# Patient Record
Sex: FEMALE | Race: AMERICAN INDIAN OR ALASKA NATIVE | Employment: FULL TIME | URBAN - METROPOLITAN AREA
[De-identification: names, ages, dates, MRNs, and addresses within clinical notes are randomized per-mention and may not be internally consistent; named-entity substitution may affect disease eponyms.]

---
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
## 2020-04-16 ENCOUNTER — HOSPITAL ENCOUNTER (EMERGENCY)
Facility: CLINIC | Age: 20
Discharge: HOME | End: 2020-04-16
Attending: EMERGENCY MEDICINE | Admitting: EMERGENCY MEDICINE

## 2020-04-16 VITALS
HEART RATE: 115 BPM | SYSTOLIC BLOOD PRESSURE: 115 MMHG | TEMPERATURE: 98.9 F | RESPIRATION RATE: 16 BRPM | DIASTOLIC BLOOD PRESSURE: 55 MMHG | OXYGEN SATURATION: 98 %

## 2020-04-16 DIAGNOSIS — R00.0 TACHYCARDIA, UNSPECIFIED: ICD-10-CM

## 2020-04-16 DIAGNOSIS — R42 VERTIGO: ICD-10-CM

## 2020-04-16 DIAGNOSIS — R55 SYNCOPE, UNSPECIFIED SYNCOPE TYPE: ICD-10-CM

## 2020-04-16 LAB
ALBUMIN UR-MCNC: 10 MG/DL
ANION GAP SERPL CALCULATED.3IONS-SCNC: 6 MMOL/L (ref 3–14)
APPEARANCE UR: CLEAR
BASOPHILS # BLD AUTO: 0 10E9/L (ref 0–0.2)
BASOPHILS NFR BLD AUTO: 0.2 %
BILIRUB UR QL STRIP: NEGATIVE
BUN SERPL-MCNC: 16 MG/DL (ref 7–30)
CALCIUM SERPL-MCNC: 8.8 MG/DL (ref 8.5–10.1)
CHLORIDE SERPL-SCNC: 108 MMOL/L (ref 96–110)
CO2 SERPL-SCNC: 25 MMOL/L (ref 20–32)
COLOR UR AUTO: YELLOW
CREAT SERPL-MCNC: 0.64 MG/DL (ref 0.5–1)
DIFFERENTIAL METHOD BLD: ABNORMAL
EOSINOPHIL # BLD AUTO: 0 10E9/L (ref 0–0.7)
EOSINOPHIL NFR BLD AUTO: 0.2 %
ERYTHROCYTE [DISTWIDTH] IN BLOOD BY AUTOMATED COUNT: 16.7 % (ref 10–15)
GFR SERPL CREATININE-BSD FRML MDRD: >90 ML/MIN/{1.73_M2}
GLUCOSE SERPL-MCNC: 120 MG/DL (ref 70–99)
GLUCOSE UR STRIP-MCNC: NEGATIVE MG/DL
HCG SERPL QL: NEGATIVE
HCG UR QL: NEGATIVE
HCT VFR BLD AUTO: 23.2 % (ref 35–47)
HGB BLD-MCNC: 7.3 G/DL (ref 11.7–15.7)
HGB UR QL STRIP: ABNORMAL
IMM GRANULOCYTES # BLD: 0.3 10E9/L (ref 0–0.4)
IMM GRANULOCYTES NFR BLD: 1.6 %
INTERPRETATION ECG - MUSE: NORMAL
KETONES UR STRIP-MCNC: 10 MG/DL
LEUKOCYTE ESTERASE UR QL STRIP: NEGATIVE
LYMPHOCYTES # BLD AUTO: 2.7 10E9/L (ref 0.8–5.3)
LYMPHOCYTES NFR BLD AUTO: 13.9 %
MCH RBC QN AUTO: 22.7 PG (ref 26.5–33)
MCHC RBC AUTO-ENTMCNC: 31.5 G/DL (ref 31.5–36.5)
MCV RBC AUTO: 72 FL (ref 78–100)
MONOCYTES # BLD AUTO: 0.7 10E9/L (ref 0–1.3)
MONOCYTES NFR BLD AUTO: 3.6 %
MUCOUS THREADS #/AREA URNS LPF: PRESENT /LPF
NEUTROPHILS # BLD AUTO: 15.8 10E9/L (ref 1.6–8.3)
NEUTROPHILS NFR BLD AUTO: 80.5 %
NITRATE UR QL: NEGATIVE
NRBC # BLD AUTO: 0.1 10*3/UL
NRBC BLD AUTO-RTO: 0 /100
PH UR STRIP: 6 PH (ref 5–7)
PLATELET # BLD AUTO: 287 10E9/L (ref 150–450)
POTASSIUM SERPL-SCNC: 3.8 MMOL/L (ref 3.4–5.3)
RBC # BLD AUTO: 3.21 10E12/L (ref 3.8–5.2)
RBC #/AREA URNS AUTO: >182 /HPF (ref 0–2)
SODIUM SERPL-SCNC: 139 MMOL/L (ref 133–144)
SOURCE: ABNORMAL
SP GR UR STRIP: 1.02 (ref 1–1.03)
SQUAMOUS #/AREA URNS AUTO: <1 /HPF (ref 0–1)
UROBILINOGEN UR STRIP-MCNC: NORMAL MG/DL (ref 0–2)
WBC # BLD AUTO: 19.5 10E9/L (ref 4–11)
WBC #/AREA URNS AUTO: 2 /HPF (ref 0–5)

## 2020-04-16 PROCEDURE — 25000128 H RX IP 250 OP 636: Performed by: EMERGENCY MEDICINE

## 2020-04-16 PROCEDURE — 99284 EMERGENCY DEPT VISIT MOD MDM: CPT | Mod: 25

## 2020-04-16 PROCEDURE — 81025 URINE PREGNANCY TEST: CPT | Performed by: EMERGENCY MEDICINE

## 2020-04-16 PROCEDURE — 81001 URINALYSIS AUTO W/SCOPE: CPT | Performed by: EMERGENCY MEDICINE

## 2020-04-16 PROCEDURE — 93010 ELECTROCARDIOGRAM REPORT: CPT | Mod: Z6 | Performed by: EMERGENCY MEDICINE

## 2020-04-16 PROCEDURE — 25000132 ZZH RX MED GY IP 250 OP 250 PS 637: Performed by: EMERGENCY MEDICINE

## 2020-04-16 PROCEDURE — 25800030 ZZH RX IP 258 OP 636: Performed by: EMERGENCY MEDICINE

## 2020-04-16 PROCEDURE — 85025 COMPLETE CBC W/AUTO DIFF WBC: CPT | Performed by: EMERGENCY MEDICINE

## 2020-04-16 PROCEDURE — 84703 CHORIONIC GONADOTROPIN ASSAY: CPT | Performed by: EMERGENCY MEDICINE

## 2020-04-16 PROCEDURE — 93005 ELECTROCARDIOGRAM TRACING: CPT

## 2020-04-16 PROCEDURE — 80048 BASIC METABOLIC PNL TOTAL CA: CPT | Performed by: EMERGENCY MEDICINE

## 2020-04-16 PROCEDURE — 96374 THER/PROPH/DIAG INJ IV PUSH: CPT

## 2020-04-16 PROCEDURE — 99284 EMERGENCY DEPT VISIT MOD MDM: CPT | Mod: 25 | Performed by: EMERGENCY MEDICINE

## 2020-04-16 PROCEDURE — 96361 HYDRATE IV INFUSION ADD-ON: CPT

## 2020-04-16 RX ORDER — ACETAMINOPHEN 325 MG/1
975 TABLET ORAL ONCE
Status: COMPLETED | OUTPATIENT
Start: 2020-04-16 | End: 2020-04-16

## 2020-04-16 RX ORDER — ONDANSETRON 2 MG/ML
4 INJECTION INTRAMUSCULAR; INTRAVENOUS ONCE
Status: COMPLETED | OUTPATIENT
Start: 2020-04-16 | End: 2020-04-16

## 2020-04-16 RX ORDER — MECLIZINE HYDROCHLORIDE 25 MG/1
50 TABLET ORAL ONCE
Status: COMPLETED | OUTPATIENT
Start: 2020-04-16 | End: 2020-04-16

## 2020-04-16 RX ORDER — MECLIZINE HCL 12.5 MG 12.5 MG/1
25 TABLET ORAL 3 TIMES DAILY PRN
Qty: 30 TABLET | Refills: 0 | Status: SHIPPED | OUTPATIENT
Start: 2020-04-16

## 2020-04-16 RX ADMIN — MECLIZINE HYDROCHLORIDE 50 MG: 25 TABLET ORAL at 21:45

## 2020-04-16 RX ADMIN — SODIUM CHLORIDE 1000 ML: 9 INJECTION, SOLUTION INTRAVENOUS at 18:01

## 2020-04-16 RX ADMIN — ACETAMINOPHEN 975 MG: 325 TABLET, FILM COATED ORAL at 22:26

## 2020-04-16 RX ADMIN — ONDANSETRON 4 MG: 2 INJECTION INTRAMUSCULAR; INTRAVENOUS at 18:01

## 2020-04-16 RX ADMIN — SODIUM CHLORIDE 1000 ML: 9 INJECTION, SOLUTION INTRAVENOUS at 19:12

## 2020-04-16 NOTE — ED PROVIDER NOTES
[facility] EMERGENCY DEPARTMENT ([facility])    4/16/20        History     Chief Complaint   Patient presents with     Loss of Consciousness     pt report fainting today in her kitchen unwinessed by anyone but was found by granmother, pt unsure if she hit her head but no swelling or pain except for a headache      HPI  Juan Xiao is a 19 year old female with a past medical history of diabetes who presents to the Emergency Department for possible syncopal episode.  Patient states she has felt intermittently tired and weak over the last 2 days also complaining of intermittent nausea that is worsened when eating.  She is also concerned that she has had a heavier than normal.  And has had clotting with her menstrual period.  Patient states she has had 2 change her pad every hour due to saturation.  She denies any chance of pregnancy and states she has not had sexual intercourse.  She denies any prior history of abdominal surgery.  Patient also denies any abdominal pain, chest pain, fevers or chills.    I have reviewed the Medications, Allergies, Past Medical and Surgical History, and Social History in the [employer] system.  PAST MEDICAL HISTORY:   Past Medical History:   Diagnosis Date     Diabetes (H)      Prediabetes     Being monitored       PAST SURGICAL HISTORY:   Past Surgical History:   Procedure Laterality Date     ENT SURGERY      tonsils age 12       Past medical history, past surgical history, medications, and allergies were reviewed with the patient. Additional pertinent items: None    FAMILY HISTORY:   Family History   Problem Relation Age of Onset     Depression Mother      Depression Maternal Aunt      Depression Cousin      Anxiety Disorder Cousin        SOCIAL HISTORY:   Social History     Tobacco Use     Smoking status: Never Smoker   Substance Use Topics     Alcohol use: No     Social history was reviewed with the patient. Additional pertinent items: None      Patient's Medications   New  Prescriptions    No medications on file   Previous Medications    IBUPROFEN (ADVIL/MOTRIN) 200 MG TABLET    Take 4 tablets (800 mg) by mouth 4 times daily    MIRTAZAPINE (REMERON PO)    Take 15 mg by mouth At Bedtime One half to one tab at bedtime   Modified Medications    No medications on file   Discontinued Medications    No medications on file        No Known Allergies     Review of Systems  ROS: 14 point ROS neg other than the symptoms noted above in the HPI.    Physical Exam   BP: 136/64  Pulse: 126  Temp: 98.4  F (36.9  C)  Resp: 18  SpO2: 99 %      Physical Exam  GEN: Well appearing, non toxic, cooperative.   HEENT: The head is normocephalic and atraumatic. Pupils are equal round and reactive to light. Extraocular motions are intact. There is no facial swelling. The neck is nontender and supple.   CV: Tachycardic rate and rhythm without murmurs rubs or gallops. 2+ radial pulses bilaterally.  PULM: Clear to auscultation bilaterally.  ABD: Soft, nontender, nondistended.   EXT: Full range of motion.  No edema.  NEURO: Cranial nerves II through XII are intact and symmetric. Bilateral upper and lower extremities grossly show full range of motion without any focal deficits.   SKIN: No rashes, ecchymosis, or lacerations  PSYCH: Calm and cooperative, interactive.     ED Course        Procedures             EKG Interpretation:      Interpreted by Juan Dean MD  Time reviewed: 1756  Symptoms at time of EKG: syncope   Rhythm: normal sinus   Rate: tachycardic  Axis: normal  Ectopy: none  Conduction: normal  ST Segments/ T Waves: No ST-T wave changes  Q Waves: none  Comparison to prior: No old EKG available    Clinical Impression: Sinus tachycardia                        Results for orders placed or performed during the hospital encounter of 04/16/20 (from the past 24 hour(s))   CBC with platelets differential   Result Value Ref Range    WBC 19.5 (H) 4.0 - 11.0 10e9/L    RBC Count 3.21 (L) 3.8 - 5.2 10e12/L     Hemoglobin 7.3 (L) 11.7 - 15.7 g/dL    Hematocrit 23.2 (L) 35.0 - 47.0 %    MCV 72 (L) 78 - 100 fl    MCH 22.7 (L) 26.5 - 33.0 pg    MCHC 31.5 31.5 - 36.5 g/dL    RDW 16.7 (H) 10.0 - 15.0 %    Platelet Count 287 150 - 450 10e9/L    Diff Method Automated Method     % Neutrophils 80.5 %    % Lymphocytes 13.9 %    % Monocytes 3.6 %    % Eosinophils 0.2 %    % Basophils 0.2 %    % Immature Granulocytes 1.6 %    Nucleated RBCs 0 0 /100    Absolute Neutrophil 15.8 (H) 1.6 - 8.3 10e9/L    Absolute Lymphocytes 2.7 0.8 - 5.3 10e9/L    Absolute Monocytes 0.7 0.0 - 1.3 10e9/L    Absolute Eosinophils 0.0 0.0 - 0.7 10e9/L    Absolute Basophils 0.0 0.0 - 0.2 10e9/L    Abs Immature Granulocytes 0.3 0 - 0.4 10e9/L    Absolute Nucleated RBC 0.1    Basic metabolic panel   Result Value Ref Range    Sodium 139 133 - 144 mmol/L    Potassium 3.8 3.4 - 5.3 mmol/L    Chloride 108 96 - 110 mmol/L    Carbon Dioxide 25 20 - 32 mmol/L    Anion Gap 6 3 - 14 mmol/L    Glucose 120 (H) 70 - 99 mg/dL    Urea Nitrogen 16 7 - 30 mg/dL    Creatinine 0.64 0.50 - 1.00 mg/dL    GFR Estimate >90 >60 mL/min/[1.73_m2]    GFR Estimate If Black >90 >60 mL/min/[1.73_m2]    Calcium 8.8 8.5 - 10.1 mg/dL   HCG qualitative   Result Value Ref Range    HCG Qualitative Serum Negative NEG^Negative   EKG 12-lead, tracing only   Result Value Ref Range    Interpretation ECG Click View Image link to view waveform and result    HCG qualitative urine   Result Value Ref Range    HCG Qual Urine Negative NEG^Negative   UA with Microscopic   Result Value Ref Range    Color Urine Yellow     Appearance Urine Clear     Glucose Urine Negative NEG^Negative mg/dL    Bilirubin Urine Negative NEG^Negative    Ketones Urine 10 (A) NEG^Negative mg/dL    Specific Gravity Urine 1.016 1.003 - 1.035    Blood Urine Moderate (A) NEG^Negative    pH Urine 6.0 5.0 - 7.0 pH    Protein Albumin Urine 10 (A) NEG^Negative mg/dL    Urobilinogen mg/dL Normal 0.0 - 2.0 mg/dL    Nitrite Urine Negative  NEG^Negative    Leukocyte Esterase Urine Negative NEG^Negative    Source Urine     WBC Urine 2 0 - 5 /HPF    RBC Urine >182 (H) 0 - 2 /HPF    Squamous Epithelial /HPF Urine <1 0 - 1 /HPF    Mucous Urine Present (A) NEG^Negative /LPF     Medications   0.9% sodium chloride BOLUS (0 mLs Intravenous Stopped 4/16/20 1907)   ondansetron (ZOFRAN) injection 4 mg (4 mg Intravenous Given 4/16/20 1801)   0.9% sodium chloride BOLUS (0 mLs Intravenous Stopped 4/16/20 2220)   meclizine (ANTIVERT) tablet 50 mg (50 mg Oral Given 4/16/20 2145)   acetaminophen (TYLENOL) tablet 975 mg (975 mg Oral Given 4/16/20 2226)             Assessments & Plan (with Medical Decision Making)   Patient is a 19-year-old female who presents after possible syncopal episode with complaints of dizziness.  Initial vitals were notable for tachycardia to the 120s.  Exam as above and notable for normal neurologic exam.  Patient was given multiple liters of IV fluids which improved her tachycardia.  Her labs were obtained and notable for leukocytosis to 19.  Patient however, does not have any overt infectious symptoms.  Urinalysis was obtained and did show blood however patient is on her menstrual cycle.  Patient was given Zofran which improved her nausea and also given meclizine which did improve her vertiginous dizzy-like symptoms.  Based on her reassuring physical exam and improved symptoms, she was discharged home with meclizine and advised to continue with oral rehydration.  Patient was given strict return precautions but advised to follow-up with her primary care doctor the next 3 to 5 days.    I have reviewed the nursing notes.    I have reviewed the findings, diagnosis, plan and need for follow up with the patient.    New Prescriptions    MECLIZINE (ANTIVERT) 12.5 MG TABLET    Take 2 tablets (25 mg) by mouth 3 times daily as needed for dizziness       Final diagnoses:   Syncope, unspecified syncope type   Vertigo       4/16/2020   [facility]     Juan Dean MD  04/16/20 6667

## 2020-04-16 NOTE — ED AVS SNAPSHOT
[facility], [place], Emergency Department  [address]  [facility] [address]  Phone:  [phone]  Fax:  [phone]                                    Juan Xiao   MRN: [id number]    Department:  [facility], Emergency Department   Date of Visit:  4/16/2020           After Visit Summary Signature Page    I have received my discharge instructions, and my questions have been answered. I have discussed any challenges I see with this plan with the nurse or doctor.    ..........................................................................................................................................  Patient/Patient Representative Signature      ..........................................................................................................................................  Patient Representative Print Name and Relationship to Patient    ..................................................               ................................................  Date                                   Time    ..........................................................................................................................................  Reviewed by Signature/Title    ...................................................              ..............................................  Date                                               Time          22EPIC Rev 08/18

## 2020-04-16 NOTE — ED NOTES
Bed: ED16C  Expected date:   Expected time:   Means of arrival:   Comments:  Ismael 89  19y F  Syncopal episode

## 2020-04-16 NOTE — ED NOTES
Sepsis BPA alert has fired and lactate was ordered No   If not, the reason was MD doesn't believe pt is septic and did not want to draw lactic at this time and Dr. Dean was notified.  Vitals 24 hr (last day)     Date/Time Temp Resp Pulse Pulse/Heart Rate Source BP    04/16/20 1702  97.2 (36.2)  18  126  Monitor  136/64    04/16/20 1649  98.4 (36.9)  --  --  --  --            WBC   Date Value Ref Range Status   04/16/2020 19.5 (H) 4.0 - 11.0 10e9/L Final

## 2020-04-16 NOTE — ED TRIAGE NOTES
Pt had a syncopal episode, pt reported heavy periods which she think may contribute. Pt has anxiety disorder and stay at home seems to be increasing her anxiety. VSS on ride to ER.

## 2020-04-21 ENCOUNTER — APPOINTMENT (OUTPATIENT)
Dept: ULTRASOUND IMAGING | Facility: CLINIC | Age: 20
End: 2020-04-21
Attending: EMERGENCY MEDICINE

## 2020-04-21 ENCOUNTER — HOSPITAL ENCOUNTER (OUTPATIENT)
Facility: CLINIC | Age: 20
Setting detail: OBSERVATION
Discharge: HOME | End: 2020-04-22
Attending: EMERGENCY MEDICINE | Admitting: OBSTETRICS & GYNECOLOGY

## 2020-04-21 DIAGNOSIS — R51.9 NONINTRACTABLE HEADACHE, UNSPECIFIED CHRONICITY PATTERN, UNSPECIFIED HEADACHE TYPE: ICD-10-CM

## 2020-04-21 DIAGNOSIS — R53.1 WEAKNESS: ICD-10-CM

## 2020-04-21 DIAGNOSIS — R06.02 SHORTNESS OF BREATH: ICD-10-CM

## 2020-04-21 DIAGNOSIS — D64.9 ANEMIA: Primary | ICD-10-CM

## 2020-04-21 DIAGNOSIS — D50.0 IRON DEFICIENCY ANEMIA DUE TO CHRONIC BLOOD LOSS: ICD-10-CM

## 2020-04-21 DIAGNOSIS — R42 DIZZINESS AND GIDDINESS: ICD-10-CM

## 2020-04-21 DIAGNOSIS — D64.9 ANEMIA, UNSPECIFIED TYPE: ICD-10-CM

## 2020-04-21 LAB
ABO + RH BLD: NORMAL
ABO + RH BLD: NORMAL
ANION GAP SERPL CALCULATED.3IONS-SCNC: 6 MMOL/L (ref 3–14)
ANISOCYTOSIS BLD QL SMEAR: SLIGHT
APTT PPP: 32 SEC (ref 22–37)
BASOPHILS # BLD AUTO: 0 10E9/L (ref 0–0.2)
BASOPHILS NFR BLD AUTO: 0 %
BLD GP AB SCN SERPL QL: NORMAL
BLD PROD TYP BPU: NORMAL
BLD UNIT ID BPU: 0
BLD UNIT ID BPU: 0
BLOOD BANK CMNT PATIENT-IMP: NORMAL
BLOOD PRODUCT CODE: NORMAL
BLOOD PRODUCT CODE: NORMAL
BPU ID: NORMAL
BPU ID: NORMAL
BUN SERPL-MCNC: 7 MG/DL (ref 7–30)
CALCIUM SERPL-MCNC: 8.8 MG/DL (ref 8.5–10.1)
CHLORIDE SERPL-SCNC: 108 MMOL/L (ref 96–110)
CO2 SERPL-SCNC: 26 MMOL/L (ref 20–32)
CREAT SERPL-MCNC: 0.59 MG/DL (ref 0.5–1)
DIFFERENTIAL METHOD BLD: ABNORMAL
EOSINOPHIL # BLD AUTO: 0.1 10E9/L (ref 0–0.7)
EOSINOPHIL NFR BLD AUTO: 0.9 %
ERYTHROCYTE [DISTWIDTH] IN BLOOD BY AUTOMATED COUNT: 16.7 % (ref 10–15)
ERYTHROCYTE [DISTWIDTH] IN BLOOD BY AUTOMATED COUNT: 17.7 % (ref 10–15)
GFR SERPL CREATININE-BSD FRML MDRD: >90 ML/MIN/{1.73_M2}
GLUCOSE SERPL-MCNC: 88 MG/DL (ref 70–99)
HCG SERPL QL: NEGATIVE
HCT VFR BLD AUTO: 17.6 % (ref 35–47)
HCT VFR BLD AUTO: 23.9 % (ref 35–47)
HGB BLD-MCNC: 5.3 G/DL (ref 11.7–15.7)
HGB BLD-MCNC: 5.6 G/DL (ref 11.7–15.7)
HGB BLD-MCNC: 7.8 G/DL (ref 11.7–15.7)
HYPOCHROMIA BLD QL: PRESENT
INR PPP: 1.2 (ref 0.86–1.14)
LYMPHOCYTES # BLD AUTO: 1.5 10E9/L (ref 0.8–5.3)
LYMPHOCYTES NFR BLD AUTO: 15.8 %
MCH RBC QN AUTO: 21.5 PG (ref 26.5–33)
MCH RBC QN AUTO: 24.6 PG (ref 26.5–33)
MCHC RBC AUTO-ENTMCNC: 30.1 G/DL (ref 31.5–36.5)
MCHC RBC AUTO-ENTMCNC: 32.6 G/DL (ref 31.5–36.5)
MCV RBC AUTO: 72 FL (ref 78–100)
MCV RBC AUTO: 75 FL (ref 78–100)
MICROCYTES BLD QL SMEAR: PRESENT
MONOCYTES # BLD AUTO: 0.2 10E9/L (ref 0–1.3)
MONOCYTES NFR BLD AUTO: 1.8 %
NEUTROPHILS # BLD AUTO: 8 10E9/L (ref 1.6–8.3)
NEUTROPHILS NFR BLD AUTO: 81.5 %
NRBC # BLD AUTO: 0.1 10*3/UL
NRBC BLD AUTO-RTO: 1 /100
NUM BPU REQUESTED: 2
PLATELET # BLD AUTO: 311 10E9/L (ref 150–450)
PLATELET # BLD AUTO: 347 10E9/L (ref 150–450)
PLATELET # BLD EST: ABNORMAL 10*3/UL
POLYCHROMASIA BLD QL SMEAR: SLIGHT
POTASSIUM SERPL-SCNC: 3.5 MMOL/L (ref 3.4–5.3)
RBC # BLD AUTO: 2.46 10E12/L (ref 3.8–5.2)
RBC # BLD AUTO: 3.17 10E12/L (ref 3.8–5.2)
SODIUM SERPL-SCNC: 140 MMOL/L (ref 133–144)
SPECIMEN EXP DATE BLD: NORMAL
TRANSFUSION STATUS PATIENT QL: NORMAL
WBC # BLD AUTO: 9.5 10E9/L (ref 4–11)
WBC # BLD AUTO: 9.8 10E9/L (ref 4–11)

## 2020-04-21 PROCEDURE — G0378 HOSPITAL OBSERVATION PER HR: HCPCS

## 2020-04-21 PROCEDURE — 25800030 ZZH RX IP 258 OP 636

## 2020-04-21 PROCEDURE — 85610 PROTHROMBIN TIME: CPT | Performed by: EMERGENCY MEDICINE

## 2020-04-21 PROCEDURE — 80048 BASIC METABOLIC PNL TOTAL CA: CPT | Performed by: STUDENT IN AN ORGANIZED HEALTH CARE EDUCATION/TRAINING PROGRAM

## 2020-04-21 PROCEDURE — 86850 RBC ANTIBODY SCREEN: CPT | Performed by: STUDENT IN AN ORGANIZED HEALTH CARE EDUCATION/TRAINING PROGRAM

## 2020-04-21 PROCEDURE — P9016 RBC LEUKOCYTES REDUCED: HCPCS | Performed by: STUDENT IN AN ORGANIZED HEALTH CARE EDUCATION/TRAINING PROGRAM

## 2020-04-21 PROCEDURE — 86900 BLOOD TYPING SEROLOGIC ABO: CPT | Performed by: STUDENT IN AN ORGANIZED HEALTH CARE EDUCATION/TRAINING PROGRAM

## 2020-04-21 PROCEDURE — 99285 EMERGENCY DEPT VISIT HI MDM: CPT | Mod: 25 | Performed by: EMERGENCY MEDICINE

## 2020-04-21 PROCEDURE — 93005 ELECTROCARDIOGRAM TRACING: CPT | Performed by: EMERGENCY MEDICINE

## 2020-04-21 PROCEDURE — 76856 US EXAM PELVIC COMPLETE: CPT

## 2020-04-21 PROCEDURE — 36430 TRANSFUSION BLD/BLD COMPNT: CPT | Performed by: EMERGENCY MEDICINE

## 2020-04-21 PROCEDURE — 85018 HEMOGLOBIN: CPT | Performed by: EMERGENCY MEDICINE

## 2020-04-21 PROCEDURE — 85730 THROMBOPLASTIN TIME PARTIAL: CPT | Performed by: EMERGENCY MEDICINE

## 2020-04-21 PROCEDURE — 84703 CHORIONIC GONADOTROPIN ASSAY: CPT | Performed by: STUDENT IN AN ORGANIZED HEALTH CARE EDUCATION/TRAINING PROGRAM

## 2020-04-21 PROCEDURE — 85025 COMPLETE CBC W/AUTO DIFF WBC: CPT | Performed by: STUDENT IN AN ORGANIZED HEALTH CARE EDUCATION/TRAINING PROGRAM

## 2020-04-21 PROCEDURE — 25000132 ZZH RX MED GY IP 250 OP 250 PS 637

## 2020-04-21 PROCEDURE — 86923 COMPATIBILITY TEST ELECTRIC: CPT | Performed by: STUDENT IN AN ORGANIZED HEALTH CARE EDUCATION/TRAINING PROGRAM

## 2020-04-21 PROCEDURE — 99285 EMERGENCY DEPT VISIT HI MDM: CPT | Mod: GC | Performed by: EMERGENCY MEDICINE

## 2020-04-21 PROCEDURE — 36415 COLL VENOUS BLD VENIPUNCTURE: CPT | Performed by: OBSTETRICS & GYNECOLOGY

## 2020-04-21 PROCEDURE — 86901 BLOOD TYPING SEROLOGIC RH(D): CPT | Performed by: STUDENT IN AN ORGANIZED HEALTH CARE EDUCATION/TRAINING PROGRAM

## 2020-04-21 PROCEDURE — 96360 HYDRATION IV INFUSION INIT: CPT | Performed by: EMERGENCY MEDICINE

## 2020-04-21 PROCEDURE — 85027 COMPLETE CBC AUTOMATED: CPT | Performed by: OBSTETRICS & GYNECOLOGY

## 2020-04-21 RX ORDER — NALOXONE HYDROCHLORIDE 0.4 MG/ML
.1-.4 INJECTION, SOLUTION INTRAMUSCULAR; INTRAVENOUS; SUBCUTANEOUS
Status: DISCONTINUED | OUTPATIENT
Start: 2020-04-21 | End: 2020-04-22 | Stop reason: HOSPADM

## 2020-04-21 RX ORDER — ACETAMINOPHEN 325 MG/1
650 TABLET ORAL EVERY 4 HOURS PRN
Status: DISCONTINUED | OUTPATIENT
Start: 2020-04-21 | End: 2020-04-22 | Stop reason: HOSPADM

## 2020-04-21 RX ORDER — ACETAMINOPHEN 650 MG/1
650 SUPPOSITORY RECTAL EVERY 4 HOURS PRN
Status: DISCONTINUED | OUTPATIENT
Start: 2020-04-21 | End: 2020-04-22 | Stop reason: HOSPADM

## 2020-04-21 RX ORDER — SODIUM CHLORIDE 9 MG/ML
INJECTION, SOLUTION INTRAVENOUS
Status: DISCONTINUED
Start: 2020-04-21 | End: 2020-04-21 | Stop reason: HOSPADM

## 2020-04-21 RX ORDER — IBUPROFEN 200 MG
200-400 TABLET ORAL EVERY 6 HOURS PRN
COMMUNITY

## 2020-04-21 RX ORDER — ONDANSETRON 4 MG/1
4 TABLET, ORALLY DISINTEGRATING ORAL EVERY 6 HOURS PRN
Status: DISCONTINUED | OUTPATIENT
Start: 2020-04-21 | End: 2020-04-22 | Stop reason: HOSPADM

## 2020-04-21 RX ORDER — ONDANSETRON 2 MG/ML
4 INJECTION INTRAMUSCULAR; INTRAVENOUS EVERY 6 HOURS PRN
Status: DISCONTINUED | OUTPATIENT
Start: 2020-04-21 | End: 2020-04-22 | Stop reason: HOSPADM

## 2020-04-21 RX ADMIN — Medication 1000 ML: at 18:06

## 2020-04-21 RX ADMIN — SODIUM CHLORIDE 1000 ML: 9 INJECTION, SOLUTION INTRAVENOUS at 18:06

## 2020-04-21 RX ADMIN — ACETAMINOPHEN 650 MG: 325 TABLET ORAL at 19:53

## 2020-04-21 ASSESSMENT — ENCOUNTER SYMPTOMS
COUGH: 0
BLOOD IN STOOL: 0
PSYCHIATRIC NEGATIVE: 1
ENDOCRINE NEGATIVE: 1
CARDIOVASCULAR NEGATIVE: 1
EYES NEGATIVE: 1
SORE THROAT: 0
CONSTIPATION: 1
RHINORRHEA: 0
FATIGUE: 1
MUSCULOSKELETAL NEGATIVE: 1
CHILLS: 0
ABDOMINAL PAIN: 0
APPETITE CHANGE: 1
SHORTNESS OF BREATH: 1
HEADACHES: 1
FEVER: 0
DIZZINESS: 1

## 2020-04-21 NOTE — ED TRIAGE NOTES
PT fainted last week at home. Came to the ER and was discharged. Pt got called from primary doctor this am to return to ER due to low hemoglobin.  Pt has extreme fatigue, dizziness when moving around. Pt denies shortness of breath.

## 2020-04-21 NOTE — ED NOTES
[facility], [place]   ED Nurse to Floor Handoff     Juan Xiao is a 19 year old female who speaks English and lives with family members,  in a home  They arrived in the ED by unknown from home    ED Chief Complaint: Fatigue (Fainted Friday and was seen in ED. Sent home. Clinic MD called and told patient to come back to ED because of blood work.)    ED Dx;   Final diagnoses:   Symptomatic Anemia ? cause         Needed?: No    Allergies: No Known Allergies.  Past Medical Hx:   Past Medical History:   Diagnosis Date     Diabetes (H)      Prediabetes     Being monitored      Baseline Mental status: WDL  Current Mental Status changes: at basesline    Infection present or suspected this encounter: no  Sepsis suspected: No  Isolation type: No active isolations     Activity level - Baseline/Home:  Independent  Activity Level - Current:   Independent    Bariatric equipment needed?: No    In the ED these meds were given:   Medications   sodium chloride 0.9 % infusion (has no administration in time range)       Drips running?  Yes    Home pump  No    Current LDAs  Peripheral IV 04/21/20 Left Wrist (Active)   Site Assessment [facility] 04/21/20 1400   Line Status Saline locked 04/21/20 1400   Phlebitis Scale 0-->no symptoms 04/21/20 1400   Infiltration Scale 0 04/21/20 1400   Number of days: 0       Peripheral IV 04/21/20 Right Upper forearm (Active)   Site Assessment [facility] 04/21/20 1459   Line Status Saline locked 04/21/20 1459   Phlebitis Scale 0-->no symptoms 04/21/20 1459   Infiltration Scale 0 04/21/20 1459   Number of days: 0       Labs results:   Labs Ordered and Resulted from Time of ED Arrival Up to the Time of Departure from the ED   CBC WITH PLATELETS DIFFERENTIAL - Abnormal; Notable for the following components:       Result Value    RBC Count 2.46 (*)     Hemoglobin 5.3 (*)     Hematocrit 17.6 (*)     MCV 72 (*)     MCH 21.5 (*)     MCHC 30.1 (*)     RDW 16.7 (*)     Nucleated  RBCs 1 (*)     All other components within normal limits   INR - Abnormal; Notable for the following components:    INR 1.20 (*)     All other components within normal limits   HEMOGLOBIN - Abnormal; Notable for the following components:    Hemoglobin 5.6 (*)     All other components within normal limits   BASIC METABOLIC PANEL   PARTIAL THROMBOPLASTIN TIME   HCG QUALITATIVE   HCG QUALITATIVE URINE   ROUTINE UA WITH MICROSCOPIC   ABO/RH TYPE AND SCREEN   RED BLOOD CELL PREPARE ORDER UNIT   BLOOD COMPONENT   BLOOD COMPONENT       Imaging Studies: No results found for this or any previous visit (from the past 24 hour(s)).    Recent vital signs:   /56   Pulse 92   Temp 98.1  F (36.7  C) (Oral)   Resp 12   Wt 138 kg (304 lb 4.8 oz)   LMP 04/16/2020   SpO2 100%   Breastfeeding No             Cardiac Rhythm: Normal Sinus  Pt needs tele? Yes  Skin/wound Issues: None    Code Status: Full Code    Pain control: good    Nausea control: good    Abnormal labs/tests/findings requiring intervention:     Family present during ED course? No   Family Comments/Social Situation comments:     Tasks needing completion: None    Stephanie Garcia RN  [facility] -- [phone] [place] [facility]  [phone] [facility]

## 2020-04-21 NOTE — ED PROVIDER NOTES
ED Provider Note  [facility]      History     Chief Complaint   Patient presents with     Fatigue     Fainted Friday and was seen in ED. Sent home. Clinic MD called and told patient to come back to ED because of blood work.     HPI  Juan Xiao is a 19 year old female with a past medical history significant for pre diabetes who presents to the ED with fatigue.  The patient was seen Friday in the ED possible syncopal episode. Patient says she was sent in by her PCP because of her low HB. Says she still feels dizzy and light headed but has not fainted since the last episode . Dizziness is worse with standing and walking. Has not been informed she has a low HB in the past. Does report her LMP was quite heavy and she needed to change her pad every hour, lasted about 7 days, with some clots. She also reports a headache, which has been constant in the last 6 days, she describes it as 'something moving'. There is associated nausea, but nil vomiting, says she feels a little short of breath. Her appetite has been poor. Nil diarrhea, but reports ongoing constipation. Recorded a slight fever last night, temperature of 99.4. Had chills this am as well.     Past Medical History  Past Medical History:   Diagnosis Date     Diabetes (H)      Prediabetes     Being monitored     Past Surgical History:   Procedure Laterality Date     ENT SURGERY      tonsils age 12     ibuprofen (ADVIL/MOTRIN) 200 MG tablet  meclizine (ANTIVERT) 12.5 MG tablet  Mirtazapine (REMERON PO)      No Known Allergies  Past medical history, past surgical history, medications, and allergies were reviewed with the patient. Additional pertinent items: None    Family History  Family History   Problem Relation Age of Onset     Depression Mother      Depression Maternal Aunt      Depression Cousin      Anxiety Disorder Cousin      Family history was reviewed with the patient. Additional pertinent items: None    Social History  Social  History     Tobacco Use     Smoking status: Never Smoker     Smokeless tobacco: Never Used   Substance Use Topics     Alcohol use: No     Drug use: No      Social history was reviewed with the patient. Additional pertinent items: None    Review of Systems   Constitutional: Positive for appetite change and fatigue. Negative for chills and fever.   HENT: Negative.  Negative for rhinorrhea and sore throat.    Eyes: Negative.    Respiratory: Positive for shortness of breath. Negative for cough.    Cardiovascular: Negative.    Gastrointestinal: Positive for constipation. Negative for abdominal pain and blood in stool.   Endocrine: Negative.    Genitourinary: Negative.    Musculoskeletal: Negative.    Skin: Negative.    Neurological: Positive for dizziness and headaches.   Psychiatric/Behavioral: Negative.    All other systems reviewed and are negative.    omplete review of systems was done and documented as above    Physical Exam   BP: 117/63  Pulse: 120  Heart Rate: 106  Temp: 99.9  F (37.7  C)  Resp: 16  Weight: 138 kg (304 lb 4.8 oz)  SpO2: 100 %  Physical Exam  Vitals signs reviewed.   Constitutional:       Appearance: She is obese.      Comments: Seems somewhat dazed, and talks slowly.      HENT:      Head: Normocephalic and atraumatic.      Nose: Nose normal.      Mouth/Throat:      Mouth: Mucous membranes are moist.   Eyes:      Extraocular Movements: Extraocular movements intact.      Conjunctiva/sclera: Conjunctivae normal.   Neck:      Musculoskeletal: Normal range of motion and neck supple.   Cardiovascular:      Rate and Rhythm: Regular rhythm. Tachycardia present.      Pulses: Normal pulses.      Heart sounds: Normal heart sounds.   Pulmonary:      Effort: Pulmonary effort is normal.      Breath sounds: Normal breath sounds.   Abdominal:      General: There is no distension.      Palpations: Abdomen is soft. There is no mass.      Tenderness: There is no abdominal tenderness. There is no right CVA  tenderness, left CVA tenderness, guarding or rebound.      Hernia: No hernia is present.   Genitourinary:     Rectum: Normal.      Comments: Guaiac negative  Musculoskeletal: Normal range of motion.   Skin:     General: Skin is warm and dry.   Neurological:      General: No focal deficit present.      Mental Status: She is alert and oriented to person, place, and time.   Psychiatric:         Thought Content: Thought content normal.         Judgment: Judgment normal.      Comments: Lethargic         ED Course   EC:59  Normal sinus rhythm  Normal ECG        ED Course as of  152   Tue 2020   1419 CBC with platelets differential          Results for orders placed or performed during the hospital encounter of 20   Basic metabolic panel     Status: None   Result Value Ref Range    Sodium 140 133 - 144 mmol/L    Potassium 3.5 3.4 - 5.3 mmol/L    Chloride 108 96 - 110 mmol/L    Carbon Dioxide 26 20 - 32 mmol/L    Anion Gap 6 3 - 14 mmol/L    Glucose 88 70 - 99 mg/dL    Urea Nitrogen 7 7 - 30 mg/dL    Creatinine 0.59 0.50 - 1.00 mg/dL    GFR Estimate >90 >60 mL/min/[1.73_m2]    GFR Estimate If Black >90 >60 mL/min/[1.73_m2]    Calcium 8.8 8.5 - 10.1 mg/dL   CBC with platelets differential     Status: Abnormal   Result Value Ref Range    WBC 9.8 4.0 - 11.0 10e9/L    RBC Count 2.46 (L) 3.8 - 5.2 10e12/L    Hemoglobin 5.3 (LL) 11.7 - 15.7 g/dL    Hematocrit 17.6 (L) 35.0 - 47.0 %    MCV 72 (L) 78 - 100 fl    MCH 21.5 (L) 26.5 - 33.0 pg    MCHC 30.1 (L) 31.5 - 36.5 g/dL    RDW 16.7 (H) 10.0 - 15.0 %    Platelet Count 347 150 - 450 10e9/L    Diff Method Manual Differential     % Neutrophils 81.5 %    % Lymphocytes 15.8 %    % Monocytes 1.8 %    % Eosinophils 0.9 %    % Basophils 0.0 %    Nucleated RBCs 1 (H) 0 /100    Absolute Neutrophil 8.0 1.6 - 8.3 10e9/L    Absolute Lymphocytes 1.5 0.8 - 5.3 10e9/L    Absolute Monocytes 0.2 0.0 - 1.3 10e9/L    Absolute Eosinophils 0.1 0.0 - 0.7 10e9/L    Absolute  Basophils 0.0 0.0 - 0.2 10e9/L    Absolute Nucleated RBC 0.1     Anisocytosis Slight     Polychromasia Slight     Microcytes Present     Hypochromasia Present     Platelet Estimate Confirming automated cell count    Hemoglobin     Status: Abnormal   Result Value Ref Range    Hemoglobin 5.6 (LL) 11.7 - 15.7 g/dL   ABO/Rh type and screen     Status: None (In process)   Result Value Ref Range    Units Ordered 2     ABO PENDING     Antibody Screen PENDING     Test Valid Only At          [facility]    Specimen Expires 04/24/2020     Crossmatch Red Blood Cells      Medications - No data to display     Assessments & Plan (with Medical Decision Making)   18 year old female seen last week for syncopal episode and weakness, HB at the time 7.3g/dl.  Today complaining of ongoing weakness and dizziness which is worse whenever she stands or  walks, patient complains of unusually heavy menstruation last week. She is guaiac negative,  HB today is 5.3 g/dl, ISTAT 5.6g/dl.   Patient with very low HB and symptomatic, no immediate known etiology, other than patient reported unusually heavy periods this month. No recent HB on file for referral.    Plan:  - ABO/RH grp and match   -Transfuse 2 units of rbc stat  -Admit to medicine    I have reviewed the nursing notes. I have reviewed the findings, diagnosis, plan and need for follow up with the patient.    New Prescriptions    No medications on file       Final diagnoses:   Symptomatic Anemia ? cause       Emergency Medicine   [facility], EMERGENCY DEPARTMENT  4/21/2020    This data collected with the Resident working in the Emergency Department.  Patient was seen and evaluated by myself and I repeated the history and physical exam with the patient.  The plan of care was discussed with them.  The key portions of the note including the entire assessment and plan reflect my documentation.    Was seen April 18th in the ED with hemoglobin 7.3  and syncope/dizzy.  Discharged and told to return per pmd today.  Patient having headaches, dizzy, mild sob.   No covid risks per evaluation.  Denies pain.  Stopped vaginal bleeding 3 days ago.  Rectal guaiac negative.  Hemoglobin 5.3, recheck 5.6.  NC oxygen given.  Discussed transfusion risks and patient signed consent for transfusion. Ordered 2 units prbc.   Epic and care everywhere review shows hemoglobin normal in 2010 but nothing more recent.  Only source seems vaginal bleeding.  upt negative.  Will admit for transfusion and further work up.  prbc transfusion started in ED. Discussed case with medicine and OB/gyn.  Accepted on ob/gyn service.  They have requested pelvic ultrasound which was ordered.  upt negative on 4/16 but will recheck again today per ob request.        Zoe Diallo MD  04/21/20 [id number]

## 2020-04-22 VITALS
TEMPERATURE: 98.3 F | RESPIRATION RATE: 16 BRPM | DIASTOLIC BLOOD PRESSURE: 70 MMHG | WEIGHT: 293 LBS | HEART RATE: 89 BPM | SYSTOLIC BLOOD PRESSURE: 115 MMHG | OXYGEN SATURATION: 100 %

## 2020-04-22 LAB
ALBUMIN UR-MCNC: NEGATIVE MG/DL
APPEARANCE UR: CLEAR
BACTERIA #/AREA URNS HPF: ABNORMAL /HPF
BILIRUB UR QL STRIP: NEGATIVE
COLOR UR AUTO: ABNORMAL
ERYTHROCYTE [DISTWIDTH] IN BLOOD BY AUTOMATED COUNT: 17.5 % (ref 10–15)
GLUCOSE UR STRIP-MCNC: NEGATIVE MG/DL
HCT VFR BLD AUTO: 23.7 % (ref 35–47)
HGB BLD-MCNC: 7.6 G/DL (ref 11.7–15.7)
HGB UR QL STRIP: ABNORMAL
KETONES UR STRIP-MCNC: NEGATIVE MG/DL
LEUKOCYTE ESTERASE UR QL STRIP: NEGATIVE
MCH RBC QN AUTO: 24.2 PG (ref 26.5–33)
MCHC RBC AUTO-ENTMCNC: 32.1 G/DL (ref 31.5–36.5)
MCV RBC AUTO: 76 FL (ref 78–100)
NITRATE UR QL: NEGATIVE
PH UR STRIP: 7.5 PH (ref 5–7)
PLATELET # BLD AUTO: 315 10E9/L (ref 150–450)
RBC # BLD AUTO: 3.14 10E12/L (ref 3.8–5.2)
RBC #/AREA URNS AUTO: 8 /HPF (ref 0–2)
SOURCE: ABNORMAL
SP GR UR STRIP: 1.01 (ref 1–1.03)
SQUAMOUS #/AREA URNS AUTO: 3 /HPF (ref 0–1)
UROBILINOGEN UR STRIP-MCNC: NORMAL MG/DL (ref 0–2)
WBC # BLD AUTO: 9.5 10E9/L (ref 4–11)
WBC #/AREA URNS AUTO: 4 /HPF (ref 0–5)

## 2020-04-22 PROCEDURE — 36415 COLL VENOUS BLD VENIPUNCTURE: CPT

## 2020-04-22 PROCEDURE — 25000128 H RX IP 250 OP 636: Performed by: OBSTETRICS & GYNECOLOGY

## 2020-04-22 PROCEDURE — 85027 COMPLETE CBC AUTOMATED: CPT

## 2020-04-22 PROCEDURE — 81001 URINALYSIS AUTO W/SCOPE: CPT | Performed by: STUDENT IN AN ORGANIZED HEALTH CARE EDUCATION/TRAINING PROGRAM

## 2020-04-22 PROCEDURE — G0378 HOSPITAL OBSERVATION PER HR: HCPCS

## 2020-04-22 PROCEDURE — 96372 THER/PROPH/DIAG INJ SC/IM: CPT

## 2020-04-22 PROCEDURE — 25000132 ZZH RX MED GY IP 250 OP 250 PS 637

## 2020-04-22 PROCEDURE — 85245 CLOT FACTOR VIII VW RISTOCTN: CPT

## 2020-04-22 RX ORDER — FERROUS SULFATE 325(65) MG
325 TABLET ORAL 2 TIMES DAILY
Qty: 120 TABLET | Refills: 0 | Status: SHIPPED | OUTPATIENT
Start: 2020-04-22

## 2020-04-22 RX ORDER — SENNOSIDES 8.6 MG
1-2 TABLET ORAL 2 TIMES DAILY PRN
Qty: 60 TABLET | Refills: 1 | Status: SHIPPED | OUTPATIENT
Start: 2020-04-22

## 2020-04-22 RX ORDER — MEDROXYPROGESTERONE ACETATE 150 MG/ML
150 INJECTION, SUSPENSION INTRAMUSCULAR ONCE
Status: COMPLETED | OUTPATIENT
Start: 2020-04-22 | End: 2020-04-22

## 2020-04-22 RX ADMIN — MEDROXYPROGESTERONE ACETATE 150 MG: 150 INJECTION, SUSPENSION, EXTENDED RELEASE INTRAMUSCULAR at 10:29

## 2020-04-22 RX ADMIN — Medication 1 MG: at 03:15

## 2020-04-22 NOTE — DISCHARGE INSTRUCTIONS
Cervical Polyps     What Is It?    The cervix is a tubelike channel that connects the uterus to the vagina. Cervical polyps are growths that usually appear on the cervix where it opens into the vagina. Polyps are usually cherry-red to reddish-purple or grayish-white. They vary in size and often look like bulbs on thin stems. Cervical polyps are usually not cancerous (benign) and can occur alone or in groups. Most polyps are small, about 1 centimeter to 2 centimeters long. Because rare types of cancerous conditions can look like polyps, all polyps should be removed and examined for signs of cancer.   The cause of cervical polyps is not well understood, but they are associated with inflammation of the cervix. They also may result from an abnormal response to the female hormone estrogen.  Cervical polyps are relatively common, especially in women older than 20 who have had at least one child. They are rare in girls who have not started menstruating. There are two types of cervical polyps:  Ectocervical polyps can develop from the outer surface layer cells of the cervix. They are more common in postmenopausal women.   Endocervical polyps develop from cervical glands inside the cervical canal. Most cervical polyps are endocervical polyps, and are more common in premenopausal women.   Symptoms  Cervical polyps may not cause any symptoms. However, you may experience:   Discharge, which can be foul-smelling if there is an infection   Bleeding between periods   Heavier bleeding during periods   Bleeding after intercourse  Diagnosis  If you have a cervical polyp, you probably won't be able to feel it or see it. Cervical polyps are discovered during routine pelvic exams or evaluations for bleeding or while getting a Pap test.   Expected Duration  Sometimes a polyp will come off on its own during sexual intercourse or menstruation. However, most polyps need to be removed to treat any symptoms and to evaluate the tissue for  signs of cancer, which is rare.   Prevention  Visit your doctor for an annual Pap test and for regular pelvic exams. A direct examination is the best way to identify cervical polyps.   Treatment  Cervical polyps are removed surgically, usually in a doctor's office. The doctor will use a special instrument, called a polyp forceps, to grasp the base of the polyp stem and then gently pluck the polyp with a gentle, twisting motion. Bleeding is usually brief and limited. Nonprescription, mild pain medication such as acetaminophen (Tylenol and others) or ibuprofen (Advil, Motrin and others) can help to relieve discomfort or cramping during or after the procedure.   The polyp or polyps are sent to a laboratory for examination. You may receive antibiotics if the polyp shows signs of infection. If the polyp is cancerous, treatment will depend on the extent and type of cancer.   Large polyps and polyp stems that are very broad usually need to be removed in an operating room using local, regional or general anesthesia. You will not need to stay in the hospital overnight. Cervical polyps may grow in the future from different areas of the cervix, usually not from the original site. Regular pelvic examination will help to identify and treat polyps before they cause symptoms.   When To Call a Professional  If you experience vaginal discharge, bleeding after intercourse, or bleeding between periods, make an appointment to see your doctor as soon as possible for a pelvic exam.  Prognosis  The outlook is excellent. The vast majority of cervical polyps are not cancerous. Once removed, polyps usually don't come back.

## 2020-04-22 NOTE — PLAN OF CARE
1840 Arrived to room 534 from ED via wheelchair. Transferred to bed with standby assist. 2nd unit of blood infusing also a 1000cc bolus of normal saline. Temp on arrival 100.0.. BP improved 131/70. Continuous telemetry monitor placed.. Sinus rhythm HR 90s. 02 sats 98% room air. Lungs clear. Denies nausea. Tolerating apple juice and pretty crackers. Declined box lunch. Standby assist Up to bathroom to void unable to measure (missed hat.). Patient states voided good amt without problems..Need ua collection. Gait steady dizziness improved. C/O headache tylenol given with some relief. 2nd unit of blood complete  at 2000. Tolerated well. Instructed to call nurse when getting out of bed.  Continue to monitor.

## 2020-04-22 NOTE — PLAN OF CARE
Alert,orientated x4.Flat affect.Watching [place] cartoons.Awake mostly through the night.Currently sleeping.  /56 (BP Location: Left arm)   Pulse 89   Temp 98.5  F (36.9  C) (Oral)   Resp 16   Wt 135.3 kg (298 lb 3.2 oz)   LMP 04/16/2020   SpO2 100%   Breastfeeding No    Up SBA1.Gait steady.Denies dizziness/lightheadedness.  Tele NSR with HR from upper 90s to the 80's at rest.Sinus tachy when up with activity,HR highest at 115.  Voiding without problems,urine sample obtained and sent to lab.  Denies vaginal bleeding.  Is passing gas,reported had x1 loose stool at 10pm last night.  PIV lines L wrist and R AC,both patent,saline locked.  LS clear,denies SOB.Satts WNL at room air.  Denies any numbness/tingling sensation.  Recheck for Hgb this morning,including VWF.  0655:OB resident states pt can be off telemonitor.    Observation Goals:  Diagnostic tests and consults completed and resulted   - Vital signs normal or at patient baseline   - Transfusions complete, Hgb stable    MN:not met  2AM:not met  4AM:not met  6AM:not met

## 2020-04-22 NOTE — H&P
CC/HPI:   Juan Xiao is a 19 year old nulliparous female who comes in through ED for severe anemia.  This is thought to be from her last menses that was 3 days of very heavy bleeding. She states menses prior to that were normal.  She was on Depo in the past, but not since 2 years ago.     She stopped depo then, as she has become phobic of leaving the house. She states she does not go outside really at all. 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     She is not sexually active.     HISTORIES:  Patient Active Problem List   Diagnosis     JONATHAN (generalized anxiety disorder)     Anemia     Past Medical History:   Diagnosis Date     Diabetes (H)      Prediabetes     Being monitored     Past Surgical History:   Procedure Laterality Date     ENT SURGERY      tonsils age 12     No current outpatient medications on file.     No Known Allergies  Social History     Socioeconomic History     Marital status: Single     Spouse name: Not on file     Number of children: Not on file     Years of education: Not on file     Highest education level: Not on file   Occupational History     Not on file   Social Needs     Financial resource strain: Not on file     Food insecurity     Worry: Not on file     Inability: Not on file     Transportation needs     Medical: Not on file     Non-medical: Not on file   Tobacco Use     Smoking status: Never Smoker     Smokeless tobacco: Never Used   Substance and Sexual Activity     Alcohol use: No     Drug use: No     Sexual activity: Not on file   Lifestyle     Physical activity     Days per week: Not on file     Minutes per session: Not on file     Stress: Not on file   Relationships     Social connections     Talks on phone: Not on file     Gets together: Not on file     Attends Denominational service: Not on file     Active member of club or organization: Not on file     Attends meetings of clubs or organizations: Not on file     Relationship status: Not on file     Intimate partner violence      Fear of current or ex partner: Not on file     Emotionally abused: Not on file     Physically abused: Not on file     Forced sexual activity: Not on file   Other Topics Concern     Not on file   Social History Narrative     Not on file     Family History   Problem Relation Age of Onset     Depression Mother      Depression Maternal Aunt      Depression Cousin      Anxiety Disorder Cousin         Gyn Hx:   Patient's last menstrual period was 04/16/2020.  Menses:   4/16/20    Review Of Systems:  CONSTITUTIONAL: NEGATIVE for fever, chills  EYES: NEGATIVE for vision changes   RESP: NEGATIVE for significant cough or SOB  CV: NEGATIVE for chest pain, palpitations   GI: NEGATIVE for nausea, abdominal pain, heartburn, or change in bowel habits  : NEGATIVE for frequency, dysuria, or hematuria  MUSCULOSKELETAL: NEGATIVE for significant arthralgias or myalgia  NEURO: NEGATIVE for weakness,or paresthesias . Had a headache and was dizzy earlier.    EXAM:  /63   Pulse 97   Temp 98.8  F (37.1  C) (Oral)   Resp 16   Wt 138 kg (304 lb 4.8 oz)   LMP 04/16/2020   SpO2 97%   Breastfeeding No   There is no height or weight on file to calculate BMI.    General - pleasant female in no acute distress.  Skin - no suspicious lesions or rashes  EENT-  PERRLA, euthyroid with out palpable nodules  Neck - supple without lymphadenopathy.  Lungs - clear to auscultation bilaterally.  Heart - regular rate and rhythm without murmur.  Abdomen - soft, nontender, nondistended, no masses or organomegaly noted.  Musculoskeletal - no gross deformities.  Neurological - normal strength, sensation, and mental status.  Pelvic: normal female external genitalia. Spec exam shows normal vaginal tissue.  Cervix nulliparous and smooth. Os closed. Small blood from os.      Pelvic uls:     TECHNIQUE:  Endovaginal sonography was added to the transabdominal  exam.     COMPARISON: None available.     FINDINGS:   Endometrium: Endometrium is 5 mm  thick.     Uterus: Measures 9.6 x 4.8 x 3.9 cm. There is 1.6 x 1.3 x 1.0 cm  echogenic nonshadowing area in the cervix of indeterminate etiology.     Right ovary: Measures 4.4 x 2.2 x 1.4 cm. It demonstrates normal  follicular structure and flow.     Left ovary: Measures 3.0 x 1.9 x 1.6 cm. It demonstrates normal  follicular structure and flow.     Additional findings: No significant free fluid in the pelvis.                                                                      IMPRESSION:    1. There is 1.6 cm echogenic nonshadowing area in the cervix of  indeterminate etiology.  2. The endometrial stripe is not thickened. No adnexal masses.     SHARATH QUARLES MD    ASSESSMENT/PLAN  (D64.9) Symptomatic Anemia cause  (primary encounter diagnosis)    Plan: Hemoglobin, Blood component, Blood component,         HCG qualitative  Will add VWF eval to AM labs.    Uls is concerning for possible cervix polyp. Nothing visible on spec exam. May consider hysteroscopy as out pt if bleeding persists or recurs.     Will transfuse for Hgb less than 7 on repeat.  Likely discharge tomorrow.     Felicita Castro MD, FACOG  [facility] Staff  OB/GYN    4/21/2020  10:49 PM

## 2020-04-22 NOTE — PLAN OF CARE
Focus: Discharge   D: Discharging to home. All information has been discussed. Unable to fill medications at pharmacy due to lack of insurance. Her family will be able to pick this up for patient as over the counter. All iv removed. P: Discharge to home.

## 2020-04-22 NOTE — DISCHARGE SUMMARY
[facility] Discharge Summary    Juan Xiao MRN# [id number]   Age: 19 year old YOB: 2000     Date of Admission:  4/21/2020  Date of Discharge:  4/22/2020  Admitting Physician:  Felicita Castro MD  Discharge Physician:  Therese Gamez MD    Admit Dx:   - Acute blood loss anemia  - Vaginal bleeding- resolved    Discharge Dx:  - Same as above  - Possible cervical polyp  - Acute blood loss anemia, treated with blood transfusion, secondary to vaginal bleeding, improved and asymptomatic    Procedures:  - Transfusion of 2 units pRBCs  - Pelvic US  - Depo Provera injection    Admit HPI:  Juan Xiao is a 19 year old nulliparous female who comes in through ED for severe anemia.  This is thought to be from her last menses that was 3 days of very heavy bleeding. She states menses prior to that were normal.  She was on Depo in the past, but not since 2 years ago. She stopped depo then, as she has become phobic of leaving the house. She states she does not go outside really at all. 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    Please see her Admission H&P for further details.    Hospital Course: Patient was admitted to observation and received 2 units of pRBCs with improvement in her hemoglobin to 7.8, stable at 7.6 on discharge. She was no longer symptomatic, and had no further bleeding.  Discussed possible presence of cervical polyp and consideration for hysteroscopy in future, especially with recurrence of bleeding.  Discussed options for assisting with heavy menses, desire Depo as did not feel she would do well remembering to take pills every day.  Depo given prior to discharge.     Discharge Medications:   Juan Xiao   Home Medication Instructions ELOISA:[id number]    Printed on:04/22/20 1003   Medication Information                      ferrous sulfate (FEROSUL) 325 (65 Fe) MG tablet  Take 1 tablet (325 mg) by mouth 2 times daily              ibuprofen (ADVIL/MOTRIN) 200 MG tablet  Take 200-400 mg by mouth every 6 hours as needed for mild pain             meclizine (ANTIVERT) 12.5 MG tablet  Take 2 tablets (25 mg) by mouth 3 times daily as needed for dizziness             melatonin 5 MG tablet  Take 5 mg by mouth nightly as needed for sleep             sennosides (SENOKOT) 8.6 MG tablet  Take 1-2 tablets by mouth 2 times daily as needed for constipation                 Discharge/Disposition:  1) Plan for follow-up in [facility] via phone visit in 1-2 weeks to discuss hysteroscopy, possible polypectomy   2) Come to ED with recurrence of bleeding where soaking more than 1 pad per hour or having clots bigger than the size of a golf ball  3) Next Depo injection planned in 3 months    Therese Gamez MD

## 2020-04-22 NOTE — PROGRESS NOTES
Progress Note  4/22/20    S: Patient sleeping in bed.  Denies any pain.  Has not had recurrence of vaginal bleeding.  States she does feel better since the blood transfusions.  Has not been out of bed yet.  Denies any palpitations or feeling lightheaded/dizzy in bed.      O:  /70 (BP Location: Left arm)   Pulse 89   Temp 98.3  F (36.8  C) (Oral)   Resp 16   Wt 135.3 kg (298 lb 3.2 oz)   LMP 04/16/2020   SpO2 100%   Breastfeeding No      General: NAD, A&Ox3  Resp: Non-labored breathing    Hemoglobin   Date Value Ref Range Status   04/22/2020 7.6 (L) 11.7 - 15.7 g/dL Final     A/P: 20 yo nulligravid female presents for treatment of symptomatic anemia due to heavy vaginal bleeding  1) Heavy vaginal bleeding: Did have US which shows possible 1.6 cm polyp, unable to be seen on SSE.  Not currently having active bleeding.  Did discuss with patient today the possible polyp and options for further evaluation with hysteroscopy, polypectomy.  Patient states she would think about that, but would prefer to avoid surgery if her bleeding doesn't recur.  Will set patient up for a phone visit as outpatient to discuss procedure further with her grandmother present.  Did discuss options for assisting with her bleeding/periods in general.  Has been on Depo in past and tolerated well.  We discussed options to help with bleeding like OCP, POP, etc..  Patient states she would prefer Depo every 3 months as she doesn't think she would do well with OCP.    2) Symptomatic Anemia: Hgb 5.3>2u PRBC>7.8>7.6.  Patient feeling better since transfusion.  Has not been out of bed and so will have her get up and move around and ensure she is feeling better.  Plan to discharge with iron and stool softener.  Discussed coming back to hospital with bleeding where she soaks more than one pad per hour.  3) Dispo: If patient tolerates ambulation without dizziness, plan to discharge home with followup to discuss Hysteroscopy, Depo before  discharge.  If still symptomatic will transfuse another unit and reassess.    Therese Gamez MD

## 2020-04-23 ENCOUNTER — PATIENT OUTREACH (OUTPATIENT)
Dept: CARE COORDINATION | Facility: CLINIC | Age: 20
End: 2020-04-23

## 2020-04-23 ENCOUNTER — TELEPHONE (OUTPATIENT)
Dept: OBGYN | Facility: CLINIC | Age: 20
End: 2020-04-23

## 2020-04-23 LAB — INTERPRETATION ECG - MUSE: NORMAL

## 2020-04-23 NOTE — TELEPHONE ENCOUNTER
lvm for patient to call scheduling, pt needs 1-2 week telephone visit to discuss hysteroscopy, possible polypectomy.

## 2020-04-23 NOTE — PROGRESS NOTES
[facility]: Post-Discharge Note  SITUATION                                                      Admission:    Admission Date: 04/21/20   Reason for Admission: Acute blood loss anemia  Discharge:   Discharge Date: 04/22/20  Discharge Diagnosis: Acute blood loss anemia  Discharge Service: OB/GYN    BACKGROUND                                                      Juan Xiao is a 19 year old nulliparous female who comes in through ED for severe anemia.  This is thought to be from her last menses that was 3 days of very heavy bleeding. She states menses prior to that were normal.  She was on Depo in the past, but not since 2 years ago.      She stopped depo then, as she has become phobic of leaving the house. She states she does not go outside really at all. 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     ASSESSMENT      Discharge Assessment  Patient reports symptoms are: Unchanged(Per patient's grandmother she was sleeping but has been doing well since being home.)  Does the patient have all of their medications?: Yes  Does patient know what their new medications are for?: Yes  Does patient have a follow-up appointment scheduled?: No  Does patient have any other questions or concerns?: No    Post-op  Did the patient have surgery or a procedure: No  Fever: No  Chills: No  Eating & Drinking: eating and drinking without complaints/concerns(Was able to eat and drink last night. No nausea or vomiting.)    PLAN                                                      Outpatient Plan:      1) Plan for follow-up in [facility] via phone visit in 1-2 weeks to discuss hysteroscopy, possible polypectomy   2) Come to ED with recurrence of bleeding where soaking more than 1 pad per hour or having clots bigger than the size of a golf ball  3) Next Depo injection planned in 3 months    No future appointments.        Kamilah Norwood, [facility]

## 2020-04-24 LAB — VWF:AC ACT/NOR PPP IA: 122 % (ref 50–180)

## 2020-05-01 ENCOUNTER — TELEPHONE (OUTPATIENT)
Dept: OBGYN | Facility: CLINIC | Age: 20
End: 2020-05-01

## 2020-05-01 NOTE — TELEPHONE ENCOUNTER
Farshad Colby        4/23/20 10:30 AM   Note      lvm for patient to call scheduling, pt needs 1-2 week telephone visit to discuss hysteroscopy, possible polypectomy.          Community called and said that phone number has changed and new number listed in chart.  [phone] is mobile number and number to call.    Left message on voice mail to please return a call to nursing at [phone].

## 2020-11-16 ENCOUNTER — HEALTH MAINTENANCE LETTER (OUTPATIENT)
Age: 20
End: 2020-11-16

## 2021-09-18 ENCOUNTER — HEALTH MAINTENANCE LETTER (OUTPATIENT)
Age: 21
End: 2021-09-18

## 2022-01-08 ENCOUNTER — HEALTH MAINTENANCE LETTER (OUTPATIENT)
Age: 22
End: 2022-01-08

## 2022-11-20 ENCOUNTER — HEALTH MAINTENANCE LETTER (OUTPATIENT)
Age: 22
End: 2022-11-20

## 2023-04-15 ENCOUNTER — HEALTH MAINTENANCE LETTER (OUTPATIENT)
Age: 23
End: 2023-04-15